# Patient Record
Sex: MALE | Race: WHITE | NOT HISPANIC OR LATINO | Employment: OTHER | URBAN - METROPOLITAN AREA
[De-identification: names, ages, dates, MRNs, and addresses within clinical notes are randomized per-mention and may not be internally consistent; named-entity substitution may affect disease eponyms.]

---

## 2018-01-01 ENCOUNTER — HOSPITAL ENCOUNTER (EMERGENCY)
Facility: HOSPITAL | Age: 78
End: 2018-12-22
Attending: SURGERY
Payer: MEDICARE

## 2018-01-01 DIAGNOSIS — I46.9 CARDIAC ARREST: ICD-10-CM

## 2018-01-01 DIAGNOSIS — I24.0: Primary | ICD-10-CM

## 2018-01-01 LAB
ALBUMIN SERPL BCP-MCNC: 3.5 G/DL
ALP SERPL-CCNC: 52 U/L
ALT SERPL W/O P-5'-P-CCNC: 227 U/L
ANION GAP SERPL CALC-SCNC: 20 MMOL/L
AST SERPL-CCNC: 298 U/L
BASOPHILS # BLD AUTO: 0.02 K/UL
BASOPHILS NFR BLD: 0.2 %
BILIRUB SERPL-MCNC: 0.3 MG/DL
BUN SERPL-MCNC: 45 MG/DL
CALCIUM SERPL-MCNC: 9.4 MG/DL
CHLORIDE SERPL-SCNC: 104 MMOL/L
CO2 SERPL-SCNC: 14 MMOL/L
CREAT SERPL-MCNC: 3.51 MG/DL
DIFFERENTIAL METHOD: ABNORMAL
EOSINOPHIL # BLD AUTO: 0 K/UL
EOSINOPHIL NFR BLD: 0.4 %
ERYTHROCYTE [DISTWIDTH] IN BLOOD BY AUTOMATED COUNT: 15.3 %
EST. GFR  (AFRICAN AMERICAN): 17.9 ML/MIN/1.73 M^2
EST. GFR  (NON AFRICAN AMERICAN): 15.5 ML/MIN/1.73 M^2
GLUCOSE SERPL-MCNC: 401 MG/DL
HCT VFR BLD AUTO: 28.7 %
HGB BLD-MCNC: 8.4 G/DL
INR PPP: 1.2
LACTATE SERPL-SCNC: 13 MMOL/L
LYMPHOCYTES # BLD AUTO: 5.2 K/UL
LYMPHOCYTES NFR BLD: 51.1 %
MCH RBC QN AUTO: 26.6 PG
MCHC RBC AUTO-ENTMCNC: 29.3 G/DL
MCV RBC AUTO: 91 FL
MONOCYTES # BLD AUTO: 0.5 K/UL
MONOCYTES NFR BLD: 5.3 %
NEUTROPHILS # BLD AUTO: 4.2 K/UL
NEUTROPHILS NFR BLD: 41.3 %
NT-PROBNP: 6650 PG/ML
PLATELET # BLD AUTO: 156 K/UL
PMV BLD AUTO: 10.8 FL
POTASSIUM SERPL-SCNC: 4.9 MMOL/L
PROT SERPL-MCNC: 5.9 G/DL
PROTHROMBIN TIME: 12.7 SEC
RBC # BLD AUTO: 3.16 M/UL
SODIUM SERPL-SCNC: 138 MMOL/L
TROPONIN I SERPL DL<=0.01 NG/ML-MCNC: 1.89 NG/ML
WBC # BLD AUTO: 10.19 K/UL

## 2018-01-01 PROCEDURE — 84484 ASSAY OF TROPONIN QUANT: CPT

## 2018-01-01 PROCEDURE — 99285 EMERGENCY DEPT VISIT HI MDM: CPT | Mod: 25

## 2018-01-01 PROCEDURE — 85025 COMPLETE CBC W/AUTO DIFF WBC: CPT

## 2018-01-01 PROCEDURE — 83880 ASSAY OF NATRIURETIC PEPTIDE: CPT

## 2018-01-01 PROCEDURE — 85610 PROTHROMBIN TIME: CPT

## 2018-01-01 PROCEDURE — 25000003 PHARM REV CODE 250: Performed by: SURGERY

## 2018-01-01 PROCEDURE — 27100171 HC OXYGEN HIGH FLOW UP TO 24 HOURS

## 2018-01-01 PROCEDURE — 63600175 PHARM REV CODE 636 W HCPCS: Performed by: SURGERY

## 2018-01-01 PROCEDURE — 80053 COMPREHEN METABOLIC PANEL: CPT

## 2018-01-01 PROCEDURE — 83605 ASSAY OF LACTIC ACID: CPT

## 2018-01-01 PROCEDURE — 36410 VNPNXR 3YR/> PHY/QHP DX/THER: CPT

## 2018-01-01 PROCEDURE — 96374 THER/PROPH/DIAG INJ IV PUSH: CPT

## 2018-01-01 PROCEDURE — 27000221 HC OXYGEN, UP TO 24 HOURS

## 2018-01-01 RX ORDER — SODIUM CHLORIDE 9 MG/ML
INJECTION, SOLUTION INTRAVENOUS
Status: COMPLETED | OUTPATIENT
Start: 2018-01-01 | End: 2018-01-01

## 2018-01-01 RX ORDER — EPINEPHRINE 0.1 MG/ML
INJECTION INTRAVENOUS CODE/TRAUMA/SEDATION MEDICATION
Status: COMPLETED | OUTPATIENT
Start: 2018-01-01 | End: 2018-01-01

## 2018-01-01 RX ORDER — CALCIUM CHLORIDE INJECTION 100 MG/ML
INJECTION, SOLUTION INTRAVENOUS CODE/TRAUMA/SEDATION MEDICATION
Status: COMPLETED | OUTPATIENT
Start: 2018-01-01 | End: 2018-01-01

## 2018-01-01 RX ADMIN — CALCIUM CHLORIDE 1 G: 100 INJECTION, SOLUTION INTRAVENOUS; INTRAVENTRICULAR at 05:12

## 2018-01-01 RX ADMIN — SODIUM CHLORIDE 1000 ML/HR: 9 INJECTION, SOLUTION INTRAVENOUS at 05:12

## 2018-01-01 RX ADMIN — EPINEPHRINE 1 MG: 0.1 INJECTION, SOLUTION ENDOTRACHEAL; INTRACARDIAC; INTRAVENOUS at 05:12

## 2018-12-23 NOTE — ED NOTES
[facility] contacted for ETA pt transport. Per Kyrie Coleman with [facility] transport is en route. Jared is currently unsure of ETA.

## 2018-12-23 NOTE — ED NOTES
[facility] contacted for pt transport. I spoke with Josi. Staff member will be sent ASAP.

## 2018-12-23 NOTE — ED PROVIDER NOTES
Encounter Date: 12/22/2018       History     Chief Complaint   Patient presents with    Cardiac Arrest     PER EMS, Pt released from [facility] for an angiogram 4 days ago. Pt c/o chest pain to family and then stopped breathing. EMS arrived right after pt stopped breathing and began CPR. EMS did CPR for 40 seconds got pulse back and then jacob downed again. Pt was given 2 epi's and 1 of atropine. Pt came intubated 7.5 et tube. .      Patient came to the emergency room at 5:30 p.m. in full cardiac arrest.  The ambulance service was called to the house after the patient complained of chest pain.  When [facility] arrived at 4:45 p.m. he was down and  unresponsive and [facility] started CPR and was able to regain a pulse.  He lost his pulse and they intubated him with a 7.5 tube in place and got equal  breath sounds and placed an IO catheter and  gave him 2 rounds of  epinephrine.  He regained his pulse but lost it in the truck coming here.  When he got here he was pulseless with fixed and dilated pupils and cool mottled extremities and CPR was started immediately patient was given over 5 rounds of epinephrine thru peripheral ivs and calcium and bicarb without any returnof pulse or a recognizable rhythm he was pronounced dead at 5:55 p.m. according the history he had a coronary angiogram done four days ago  results of which are not known because he is not part of the [facility] system presumed cause of death  Cause was cardiac arrest secondary to coronary thrombosis          Review of patient's allergies indicates:  Not on File  No past medical history on file.  No past surgical history on file.  No family history on file.  Social History     Tobacco Use    Smoking status: Not on file   Substance Use Topics    Alcohol use: Not on file    Drug use: Not on file     Review of Systems   Unable to perform ROS: Intubated       Physical Exam     Initial Vitals [12/22/18 1735]   BP Pulse Resp Temp SpO2   -- (!) 0 (!) 0 -- (!) 61 %       MAP       --         Physical Exam    Constitutional: Pulses: No pulses palpable.   Eyes: Pupils: Fixed and Dilated.   Cardiovascular: CPR in Progress.   Pulmonary/Chest: Ventilations: Ventilator.   Musculoskeletal: No extremity trauma present.   Neurological: Unresponsive to stimuli.   Skin: Dependent lividity present.         ED Course   External Jugular IV  Date/Time: 2018 6:20 PM  Performed by: OLI Koo III, MD  Authorized by: OLI Koo III, MD   Consent Done: Emergent Situation  Location (Ext Jugular): Left.  Catheter Size: 18 ga.  Catheter Type: Jelco.  Fixation/Dressing: Taped in place and Tegaderm.  Patient tolerance: Patient tolerated the procedure well with no immediate complications        Labs Reviewed   COMPREHENSIVE METABOLIC PANEL - Abnormal; Notable for the following components:       Result Value    CO2 14 (*)     Glucose 401 (*)     BUN, Bld 45 (*)     Creatinine 3.51 (*)     Total Protein 5.9 (*)      (*)      (*)     Anion Gap 20 (*)     eGFR if  17.9 (*)     eGFR if non  15.5 (*)     All other components within normal limits   CBC W/ AUTO DIFFERENTIAL - Abnormal; Notable for the following components:    RBC 3.16 (*)     Hemoglobin 8.4 (*)     Hematocrit 28.7 (*)     MCH 26.6 (*)     MCHC 29.3 (*)     RDW 15.3 (*)     Lymph # 5.2 (*)     Lymph% 51.1 (*)     All other components within normal limits   DRUG SCREEN PANEL, URINE EMERGENCY   PROTIME-INR   TROPONIN I   URINALYSIS, REFLEX TO URINE CULTURE   LACTIC ACID, PLASMA   NT-PRO NATRIURETIC PEPTIDE          Imaging Results          X-Ray Chest 1 View (In process)                                       Clinical Impression:   The primary encounter diagnosis was Coronary thrombosis. A diagnosis of Cardiac arrest was also pertinent to this visit.      Disposition:   Disposition:                         OLI Koo III, MD  [id number]

## 2018-12-23 NOTE — ED NOTES
[facility] transport to ED for transport of pt to [facility].

## 2018-12-23 NOTE — ED NOTES
Per Bhavik Jonas of [facility], pt is not suitable for donation due to age and medical history. Referral number: [id number]

## 2018-12-23 NOTE — ED NOTES
Patient arrived via EMS intubated with 7.5 Et-Tube secured at the 22cm right lip. CPR in progress. Patient bagged with ambu bag with 100% O2.  Patient pronounced by Dr. LINDA Koo at 1755.